# Patient Record
Sex: FEMALE | Race: WHITE | NOT HISPANIC OR LATINO | Employment: FULL TIME | URBAN - NONMETROPOLITAN AREA
[De-identification: names, ages, dates, MRNs, and addresses within clinical notes are randomized per-mention and may not be internally consistent; named-entity substitution may affect disease eponyms.]

---

## 2023-04-17 ENCOUNTER — HOSPITAL ENCOUNTER (OUTPATIENT)
Dept: DATA CONVERSION | Facility: HOSPITAL | Age: 38
End: 2023-04-17
Attending: NURSE PRACTITIONER

## 2024-01-30 ENCOUNTER — OFFICE VISIT (OUTPATIENT)
Dept: PRIMARY CARE | Facility: CLINIC | Age: 39
End: 2024-01-30
Payer: COMMERCIAL

## 2024-01-30 VITALS
WEIGHT: 215 LBS | BODY MASS INDEX: 35.82 KG/M2 | SYSTOLIC BLOOD PRESSURE: 116 MMHG | HEIGHT: 65 IN | DIASTOLIC BLOOD PRESSURE: 70 MMHG | HEART RATE: 72 BPM

## 2024-01-30 DIAGNOSIS — L40.9 PSORIASIS: ICD-10-CM

## 2024-01-30 DIAGNOSIS — I10 PRIMARY HYPERTENSION: ICD-10-CM

## 2024-01-30 DIAGNOSIS — F41.9 ANXIETY: ICD-10-CM

## 2024-01-30 DIAGNOSIS — F17.210 CIGARETTE NICOTINE DEPENDENCE WITHOUT COMPLICATION: ICD-10-CM

## 2024-01-30 DIAGNOSIS — E66.01 CLASS 2 SEVERE OBESITY DUE TO EXCESS CALORIES WITH SERIOUS COMORBIDITY AND BODY MASS INDEX (BMI) OF 35.0 TO 35.9 IN ADULT (MULTI): ICD-10-CM

## 2024-01-30 DIAGNOSIS — Z00.00 ROUTINE GENERAL MEDICAL EXAMINATION AT A HEALTH CARE FACILITY: Primary | ICD-10-CM

## 2024-01-30 PROCEDURE — 3078F DIAST BP <80 MM HG: CPT | Performed by: STUDENT IN AN ORGANIZED HEALTH CARE EDUCATION/TRAINING PROGRAM

## 2024-01-30 PROCEDURE — 99204 OFFICE O/P NEW MOD 45 MIN: CPT | Performed by: STUDENT IN AN ORGANIZED HEALTH CARE EDUCATION/TRAINING PROGRAM

## 2024-01-30 PROCEDURE — 3008F BODY MASS INDEX DOCD: CPT | Performed by: STUDENT IN AN ORGANIZED HEALTH CARE EDUCATION/TRAINING PROGRAM

## 2024-01-30 PROCEDURE — 3074F SYST BP LT 130 MM HG: CPT | Performed by: STUDENT IN AN ORGANIZED HEALTH CARE EDUCATION/TRAINING PROGRAM

## 2024-01-30 RX ORDER — BUSPIRONE HYDROCHLORIDE 5 MG/1
5 TABLET ORAL 2 TIMES DAILY PRN
Qty: 60 TABLET | Refills: 1 | Status: SHIPPED | OUTPATIENT
Start: 2024-01-30 | End: 2025-01-29

## 2024-01-30 RX ORDER — HYDROCHLOROTHIAZIDE 25 MG/1
25 TABLET ORAL DAILY
Qty: 90 TABLET | Refills: 3 | Status: SHIPPED | OUTPATIENT
Start: 2024-01-30 | End: 2025-01-29

## 2024-01-30 RX ORDER — SEMAGLUTIDE 0.25 MG/.5ML
0.25 INJECTION, SOLUTION SUBCUTANEOUS
Qty: 2 ML | Refills: 1 | Status: SHIPPED | OUTPATIENT
Start: 2024-01-30 | End: 2024-02-18

## 2024-01-30 RX ORDER — HYDROCHLOROTHIAZIDE 25 MG/1
25 TABLET ORAL DAILY
COMMUNITY
Start: 2023-07-06 | End: 2024-01-30 | Stop reason: SDUPTHER

## 2024-01-30 RX ORDER — LISINOPRIL 10 MG/1
10 TABLET ORAL DAILY
COMMUNITY
Start: 2024-01-04 | End: 2024-01-30 | Stop reason: SDUPTHER

## 2024-01-30 RX ORDER — LISINOPRIL 10 MG/1
10 TABLET ORAL DAILY
Qty: 90 TABLET | Refills: 3 | Status: SHIPPED | OUTPATIENT
Start: 2024-01-30 | End: 2025-01-29

## 2024-01-30 ASSESSMENT — PATIENT HEALTH QUESTIONNAIRE - PHQ9
SUM OF ALL RESPONSES TO PHQ9 QUESTIONS 1 AND 2: 0
2. FEELING DOWN, DEPRESSED OR HOPELESS: NOT AT ALL
1. LITTLE INTEREST OR PLEASURE IN DOING THINGS: NOT AT ALL

## 2024-01-30 NOTE — PROGRESS NOTES
Subjective   Patient ID: Mile Dumont is a 38 y.o. female who presents for establish care. Previous provider [facility].     HPI  HTN - managed on lisinopril and hydrochlorothiazide, states that she did try off  the antihypertensives and had significant symptoms, has been treated x7yrs, tolerates the medications, asymptmoatic when she takes them    Anxiety - managed on daily Xanax x19yrs with previous PCP, was on 1mg 3-4x/d then stopped abruptly, anxiety now seems to flare the week before periods which are regular, struggles with intrusive thoughts, tried hydroxyzine which did help, tried trazodone a long time ago, did try propranolol in the past as well, does have good support system at home    BMI - started exercising on treadmill daily 3wk ago, started at 1mi and has been increasing, has tried Adipex in the past as well as semaglutide at [facility] more recently helped in the past but not more regularly, there is no fhx thyroid ca and no personal hx pancreatitis    Psoriasis - chronic, localized mainly to lower extremities    Cervical Cancer Screening: [facility], due  Breast Cancer Screening: plan to start at age 40  Osteoporosis Screening: plan to start at age 65  Colon Cancer Screening: no fhx, asymptomatic, plan to start at age 44  Tobacco: 10 pack-yr hx, 1/2ppd since teenager, not yet ready to quit  Alcohol: social  Recreational Drugs: denies  Immunizations: declines influenza and [id number]     Review of Systems   Constitutional:  Negative for chills and fever.   HENT:  Negative for congestion and rhinorrhea.    Eyes:  Negative for pain and redness.   Respiratory:  Negative for cough and shortness of breath.    Cardiovascular:  Negative for chest pain, palpitations and leg swelling.   Gastrointestinal:  Negative for abdominal pain, blood in stool, constipation, diarrhea, nausea and vomiting.   Endocrine: Negative for cold intolerance and heat intolerance.   Genitourinary:  Negative for dysuria and  "flank pain.   Musculoskeletal:  Negative for arthralgias and myalgias.   Skin:  Negative for rash.   Neurological:  Negative for dizziness, weakness, numbness and headaches.   Hematological:  Negative for adenopathy.   Psychiatric/Behavioral:  Negative for dysphoric mood and sleep disturbance. The patient is nervous/anxious.      Objective   /70   Pulse 72   Ht 1.651 m (5' 5\")   Wt 97.5 kg (215 lb)   BMI 35.78 kg/m²     Physical Exam  Vitals reviewed.   Constitutional:       General: She is not in acute distress.     Appearance: Normal appearance.   HENT:      Head: Normocephalic and atraumatic.   Eyes:      General: No scleral icterus.     Conjunctiva/sclera: Conjunctivae normal.   Cardiovascular:      Rate and Rhythm: Normal rate and regular rhythm.      Heart sounds: No murmur heard.  Pulmonary:      Effort: Pulmonary effort is normal. No respiratory distress.      Breath sounds: Normal breath sounds.   Abdominal:      General: Bowel sounds are normal. There is no distension.      Palpations: Abdomen is soft.      Tenderness: There is no abdominal tenderness. There is no guarding or rebound.   Musculoskeletal:         General: No swelling or deformity.      Cervical back: Normal range of motion and neck supple.   Skin:     General: Skin is warm and dry.      Findings: No rash.   Neurological:      General: No focal deficit present.      Mental Status: She is alert.   Psychiatric:         Mood and Affect: Mood normal.         Behavior: Behavior normal.       Assessment/Plan   Problem List Items Addressed This Visit             ICD-10-CM    Psoriasis L40.9     Chronic. Will continue to monitor.         Primary hypertension I10     BP at goal. Continue lisinopril and hydrochlorothiazide at current doses. Will update labs and will follow up with results.         Relevant Medications    hydroCHLOROthiazide (HYDRODiuril) 25 mg tablet    lisinopril 10 mg tablet    Class 2 severe obesity due to excess calories " with serious comorbidity and body mass index (BMI) of 35.0 to 35.9 in adult (CMS/[facility]) E66.01, Z68.35     Dietary modifications and recommendation for 150 minutes of moderate-intensity exercise per week reviewed. Reviewed options for pharmacologic assistance. Using shared decision making, we decided to trial GLP1. No contraindications. Medication dosing and side effects reviewed. Return precautions reviewed.          Relevant Medications    semaglutide, weight loss, (Wegovy) 0.25 mg/0.5 mL pen injector    Cigarette nicotine dependence without complication F17.210     Encouraged smoking cessation. Patient instructed to follow up if/when she desires pharmacologic assistance.           Anxiety F41.9     Reviewed treatment options. Using shared decision making, we decided to trial Buspar. Medication dosing and side effects reviewed. Return precautions reviewed.          Relevant Medications    busPIRone (Buspar) 5 mg tablet     Other Visit Diagnoses         Codes    Routine general medical examination at a health care facility    -  Primary Z00.00    Relevant Orders    CBC and Auto Differential    Comprehensive Metabolic Panel    Lipid Panel        Will update labs and will follow up with results. Follow up in 6wk for recheck, sooner if needed.

## 2024-02-04 PROBLEM — I10 PRIMARY HYPERTENSION: Status: ACTIVE | Noted: 2024-02-04

## 2024-02-04 PROBLEM — F17.210 CIGARETTE NICOTINE DEPENDENCE WITHOUT COMPLICATION: Status: ACTIVE | Noted: 2024-02-04

## 2024-02-04 PROBLEM — E66.812 CLASS 2 SEVERE OBESITY DUE TO EXCESS CALORIES WITH SERIOUS COMORBIDITY AND BODY MASS INDEX (BMI) OF 35.0 TO 35.9 IN ADULT: Status: ACTIVE | Noted: 2024-02-04

## 2024-02-04 PROBLEM — L40.9 PSORIASIS: Status: ACTIVE | Noted: 2024-02-04

## 2024-02-04 PROBLEM — E66.01 CLASS 2 SEVERE OBESITY DUE TO EXCESS CALORIES WITH SERIOUS COMORBIDITY AND BODY MASS INDEX (BMI) OF 35.0 TO 35.9 IN ADULT (MULTI): Status: ACTIVE | Noted: 2024-02-04

## 2024-02-04 PROBLEM — F41.9 ANXIETY: Status: ACTIVE | Noted: 2024-02-04

## 2024-02-04 ASSESSMENT — ENCOUNTER SYMPTOMS
FEVER: 0
MYALGIAS: 0
EYE REDNESS: 0
NUMBNESS: 0
FLANK PAIN: 0
BLOOD IN STOOL: 0
DIARRHEA: 0
EYE PAIN: 0
RHINORRHEA: 0
COUGH: 0
ABDOMINAL PAIN: 0
WEAKNESS: 0
VOMITING: 0
ARTHRALGIAS: 0
DIZZINESS: 0
PALPITATIONS: 0
CHILLS: 0
ADENOPATHY: 0
NERVOUS/ANXIOUS: 1
NAUSEA: 0
DYSURIA: 0
SHORTNESS OF BREATH: 0
CONSTIPATION: 0
SLEEP DISTURBANCE: 0
DYSPHORIC MOOD: 0
HEADACHES: 0

## 2024-02-04 NOTE — ASSESSMENT & PLAN NOTE
Reviewed treatment options. Using shared decision making, we decided to trial Buspar. Medication dosing and side effects reviewed. Return precautions reviewed.

## 2024-02-04 NOTE — ASSESSMENT & PLAN NOTE
BP at goal. Continue lisinopril and hydrochlorothiazide at current doses. Will update labs and will follow up with results.

## 2024-02-04 NOTE — ASSESSMENT & PLAN NOTE
Encouraged smoking cessation. Patient instructed to follow up if/when she desires pharmacologic assistance.

## 2024-02-04 NOTE — ASSESSMENT & PLAN NOTE
Dietary modifications and recommendation for 150 minutes of moderate-intensity exercise per week reviewed. Reviewed options for pharmacologic assistance. Using shared decision making, we decided to trial GLP1. No contraindications. Medication dosing and side effects reviewed. Return precautions reviewed.

## 2024-02-16 ENCOUNTER — OFFICE VISIT (OUTPATIENT)
Dept: PRIMARY CARE | Facility: CLINIC | Age: 39
End: 2024-02-16
Payer: COMMERCIAL

## 2024-02-16 VITALS
OXYGEN SATURATION: 95 % | WEIGHT: 218 LBS | BODY MASS INDEX: 36.28 KG/M2 | DIASTOLIC BLOOD PRESSURE: 80 MMHG | SYSTOLIC BLOOD PRESSURE: 128 MMHG | HEART RATE: 108 BPM

## 2024-02-16 DIAGNOSIS — E66.01 CLASS 2 SEVERE OBESITY DUE TO EXCESS CALORIES WITH SERIOUS COMORBIDITY AND BODY MASS INDEX (BMI) OF 35.0 TO 35.9 IN ADULT (MULTI): Primary | ICD-10-CM

## 2024-02-16 DIAGNOSIS — E66.01 CLASS 2 SEVERE OBESITY DUE TO EXCESS CALORIES WITH SERIOUS COMORBIDITY AND BODY MASS INDEX (BMI) OF 36.0 TO 36.9 IN ADULT (MULTI): ICD-10-CM

## 2024-02-16 DIAGNOSIS — F41.9 ANXIETY: ICD-10-CM

## 2024-02-16 DIAGNOSIS — N91.2 AMENORRHEA: ICD-10-CM

## 2024-02-16 DIAGNOSIS — I10 PRIMARY HYPERTENSION: ICD-10-CM

## 2024-02-16 LAB — PREGNANCY TEST URINE, POC: NEGATIVE

## 2024-02-16 PROCEDURE — 3074F SYST BP LT 130 MM HG: CPT | Performed by: STUDENT IN AN ORGANIZED HEALTH CARE EDUCATION/TRAINING PROGRAM

## 2024-02-16 PROCEDURE — 80307 DRUG TEST PRSMV CHEM ANLYZR: CPT

## 2024-02-16 PROCEDURE — 99213 OFFICE O/P EST LOW 20 MIN: CPT | Performed by: STUDENT IN AN ORGANIZED HEALTH CARE EDUCATION/TRAINING PROGRAM

## 2024-02-16 PROCEDURE — 81025 URINE PREGNANCY TEST: CPT | Performed by: STUDENT IN AN ORGANIZED HEALTH CARE EDUCATION/TRAINING PROGRAM

## 2024-02-16 PROCEDURE — 3008F BODY MASS INDEX DOCD: CPT | Performed by: STUDENT IN AN ORGANIZED HEALTH CARE EDUCATION/TRAINING PROGRAM

## 2024-02-16 PROCEDURE — 3079F DIAST BP 80-89 MM HG: CPT | Performed by: STUDENT IN AN ORGANIZED HEALTH CARE EDUCATION/TRAINING PROGRAM

## 2024-02-16 RX ORDER — TOPIRAMATE 25 MG/1
25 TABLET ORAL 2 TIMES DAILY
Qty: 60 TABLET | Refills: 1 | Status: SHIPPED | OUTPATIENT
Start: 2024-02-16 | End: 2024-04-22 | Stop reason: ALTCHOICE

## 2024-02-16 NOTE — PROGRESS NOTES
Subjective   Patient ID: Mile Dumont is a 38 y.o. female who presents for Weight Loss (Discuss medication).    HPI  BMI - GLP1 not covered by insurance, had success with Adipex in the past, no side effects, she has been watching diet, planning to start exercise regimen, interested in retrying Adipex and wonders also about Topamax    Anxiety - Buspar is helping, she is taking as needed, works well when she takes it    Amenorrhea - about 10d late which is somewhat unusual, she is s/p tubal ligation    Review of Systems   Constitutional:  Negative for chills and fever.   Respiratory:  Negative for cough and shortness of breath.    Cardiovascular:  Negative for chest pain and palpitations.   Skin:  Negative for rash.   Psychiatric/Behavioral:  Negative for dysphoric mood. The patient is not nervous/anxious.      Objective   /80   Pulse 108   Wt 98.9 kg (218 lb)   SpO2 95%   BMI 36.28 kg/m²     Physical Exam  Constitutional:       Appearance: Normal appearance.   HENT:      Head: Normocephalic and atraumatic.   Eyes:      General: No scleral icterus.     Conjunctiva/sclera: Conjunctivae normal.   Cardiovascular:      Rate and Rhythm: Normal rate and regular rhythm.      Heart sounds: No murmur heard.  Pulmonary:      Effort: Pulmonary effort is normal. No respiratory distress.      Breath sounds: Normal breath sounds.   Musculoskeletal:         General: No swelling. Normal range of motion.      Cervical back: Normal range of motion and neck supple.   Skin:     General: Skin is warm and dry.      Findings: No rash.   Neurological:      General: No focal deficit present.      Mental Status: She is alert.   Psychiatric:         Mood and Affect: Mood normal.         Behavior: Behavior normal.       Assessment/Plan   Problem List Items Addressed This Visit             ICD-10-CM    Primary hypertension I10     BP at goal. No changes today. I am comfortable with Adipex.         Class 2 severe obesity due to excess  calories with serious comorbidity and body mass index (BMI) of 36.0 to 36.9 in adult (CMS/[facility]) - Primary E66.01, Z68.36     Reviewed options for pharmacologic assistance. She had success with Adipex in the past - I am okay with another round pending UDS which was obtained today. We will also trial Topamax. Medication dosing and side effects reviewed. Dietary modifications and recommendation for 150 minutes of moderate-intensity exercise per week reviewed.     I have personally reviewed the OARRS for this patient. I have considered the risk dependence, addiction, and diversion. There are no concerns at this time. I believe that it is clinically appropriate for this patient to be prescribed this medication based on documented diagnosis.          Relevant Medications    topiramate (Topamax) 25 mg tablet    Anxiety F41.9     Improved with prn Buspar. Will continue.         Amenorrhea N91.2     10d late. Urine hcg neg today in office. Will continue to monitor.         Relevant Orders    POCT pregnancy, urine manually resulted (Completed)   Follow up in 1mo for recheck, sooner if needed.

## 2024-02-17 LAB
AMPHETAMINES UR QL SCN: NORMAL
BARBITURATES UR QL SCN: NORMAL
BENZODIAZ UR QL SCN: NORMAL
BZE UR QL SCN: NORMAL
CANNABINOIDS UR QL SCN: NORMAL
FENTANYL+NORFENTANYL UR QL SCN: NORMAL
OPIATES UR QL SCN: NORMAL
OXYCODONE+OXYMORPHONE UR QL SCN: NORMAL
PCP UR QL SCN: NORMAL

## 2024-02-18 DIAGNOSIS — E66.01 CLASS 2 SEVERE OBESITY DUE TO EXCESS CALORIES WITH SERIOUS COMORBIDITY AND BODY MASS INDEX (BMI) OF 36.0 TO 36.9 IN ADULT (MULTI): Primary | ICD-10-CM

## 2024-02-18 PROBLEM — N91.2 AMENORRHEA: Status: ACTIVE | Noted: 2024-02-18

## 2024-02-18 RX ORDER — PHENTERMINE HYDROCHLORIDE 37.5 MG/1
37.5 TABLET ORAL
Qty: 30 TABLET | Refills: 0 | Status: SHIPPED | OUTPATIENT
Start: 2024-02-18 | End: 2024-03-20 | Stop reason: SDUPTHER

## 2024-02-18 ASSESSMENT — ENCOUNTER SYMPTOMS
FEVER: 0
COUGH: 0
DYSPHORIC MOOD: 0
SHORTNESS OF BREATH: 0
NERVOUS/ANXIOUS: 0
PALPITATIONS: 0
CHILLS: 0

## 2024-02-18 NOTE — ASSESSMENT & PLAN NOTE
Reviewed options for pharmacologic assistance. She had success with Adipex in the past - I am okay with another round pending UDS which was obtained today. We will also trial Topamax. Medication dosing and side effects reviewed. Dietary modifications and recommendation for 150 minutes of moderate-intensity exercise per week reviewed.     I have personally reviewed the OARRS for this patient. I have considered the risk dependence, addiction, and diversion. There are no concerns at this time. I believe that it is clinically appropriate for this patient to be prescribed this medication based on documented diagnosis.

## 2024-02-26 DIAGNOSIS — R05.1 ACUTE COUGH: Primary | ICD-10-CM

## 2024-02-26 RX ORDER — AZITHROMYCIN 250 MG/1
TABLET, FILM COATED ORAL
Qty: 6 TABLET | Refills: 0 | Status: SHIPPED | OUTPATIENT
Start: 2024-02-26 | End: 2024-03-01

## 2024-03-12 ENCOUNTER — OFFICE VISIT (OUTPATIENT)
Dept: PRIMARY CARE | Facility: CLINIC | Age: 39
End: 2024-03-12
Payer: COMMERCIAL

## 2024-03-12 VITALS
SYSTOLIC BLOOD PRESSURE: 118 MMHG | DIASTOLIC BLOOD PRESSURE: 80 MMHG | HEIGHT: 65 IN | BODY MASS INDEX: 35.75 KG/M2 | WEIGHT: 214.6 LBS | HEART RATE: 92 BPM

## 2024-03-12 DIAGNOSIS — E66.01 CLASS 2 SEVERE OBESITY DUE TO EXCESS CALORIES WITH SERIOUS COMORBIDITY AND BODY MASS INDEX (BMI) OF 35.0 TO 35.9 IN ADULT (MULTI): ICD-10-CM

## 2024-03-12 DIAGNOSIS — I10 PRIMARY HYPERTENSION: ICD-10-CM

## 2024-03-12 DIAGNOSIS — H60.501 ACUTE OTITIS EXTERNA OF RIGHT EAR, UNSPECIFIED TYPE: Primary | ICD-10-CM

## 2024-03-12 PROBLEM — N91.2 AMENORRHEA: Status: RESOLVED | Noted: 2024-02-18 | Resolved: 2024-03-12

## 2024-03-12 PROCEDURE — 99213 OFFICE O/P EST LOW 20 MIN: CPT | Performed by: STUDENT IN AN ORGANIZED HEALTH CARE EDUCATION/TRAINING PROGRAM

## 2024-03-12 PROCEDURE — 3079F DIAST BP 80-89 MM HG: CPT | Performed by: STUDENT IN AN ORGANIZED HEALTH CARE EDUCATION/TRAINING PROGRAM

## 2024-03-12 PROCEDURE — 3074F SYST BP LT 130 MM HG: CPT | Performed by: STUDENT IN AN ORGANIZED HEALTH CARE EDUCATION/TRAINING PROGRAM

## 2024-03-12 PROCEDURE — 3008F BODY MASS INDEX DOCD: CPT | Performed by: STUDENT IN AN ORGANIZED HEALTH CARE EDUCATION/TRAINING PROGRAM

## 2024-03-12 RX ORDER — CIPROFLOXACIN AND DEXAMETHASONE 3; 1 MG/ML; MG/ML
4 SUSPENSION/ DROPS AURICULAR (OTIC) 2 TIMES DAILY
Qty: 7.5 ML | Refills: 0 | Status: SHIPPED | OUTPATIENT
Start: 2024-03-12 | End: 2024-03-19

## 2024-03-12 ASSESSMENT — ENCOUNTER SYMPTOMS
SHORTNESS OF BREATH: 0
DIARRHEA: 1
CHILLS: 0
DYSPHORIC MOOD: 0
PALPITATIONS: 0
NERVOUS/ANXIOUS: 0
FEVER: 0
COUGH: 0

## 2024-03-12 NOTE — PROGRESS NOTES
"Subjective   Patient ID: Mile Dumont is a 38 y.o. female who presents for 6 week follow up    HPI  BMI - down 4lbs, doing well with Adipex and Topamax, increased water intake, no adverse effects aside from occasional diarrhea    HTN - BP at goal, tolerating antihypertensives, no changes in BP with stimulant    Ear itching - notes chronic intermittent ear itching and fullness, having itching and fullness on the R currently    Review of Systems   Constitutional:  Negative for chills and fever.   HENT:          R ear itching and fullness   Respiratory:  Negative for cough and shortness of breath.    Cardiovascular:  Negative for chest pain and palpitations.   Gastrointestinal:  Positive for diarrhea.   Skin:  Negative for rash.   Psychiatric/Behavioral:  Negative for dysphoric mood. The patient is not nervous/anxious.      Objective   /80   Pulse 92   Ht 1.651 m (5' 5\")   Wt 97.3 kg (214 lb 9.6 oz)   BMI 35.71 kg/m²     Physical Exam  Constitutional:       Appearance: Normal appearance.   HENT:      Head: Normocephalic and atraumatic.      Left Ear: Tympanic membrane, ear canal and external ear normal.      Ears:      Comments: L EAC edematous with purulent material in canal, TM not visualized  Eyes:      General: No scleral icterus.     Conjunctiva/sclera: Conjunctivae normal.   Cardiovascular:      Rate and Rhythm: Normal rate and regular rhythm.      Heart sounds: No murmur heard.  Pulmonary:      Effort: Pulmonary effort is normal. No respiratory distress.   Musculoskeletal:         General: No swelling. Normal range of motion.      Cervical back: Normal range of motion and neck supple.   Skin:     General: Skin is warm and dry.      Findings: No rash.   Neurological:      General: No focal deficit present.      Mental Status: She is alert.   Psychiatric:         Mood and Affect: Mood normal.         Behavior: Behavior normal.       Assessment/Plan   Problem List Items Addressed This Visit             " ICD-10-CM    Primary hypertension I10     BP at goal. No changes today.         Class 2 severe obesity due to excess calories with serious comorbidity and body mass index (BMI) of 35.0 to 35.9 in adult (CMS/[facility]) E66.01, Z68.35     Doing well with Adipex and Topamax. Discussed dosing. Using shared decision making, we decided to increase Topamax. She will try increasing Topamax to 50mg qam and 25mg at bedtime and then up to 50mg bid as tolerated. Return precautions reviewed.     I have personally reviewed the OARRS for this patient. I have considered the risk dependence, addiction, and diversion. There are no concerns at this time. I believe that it is clinically appropriate for this patient to be prescribed this medication based on documented diagnosis.          Relevant Orders    Follow Up In Primary Care - Established    Follow Up In Primary Care - Established    Acute otitis externa of right ear - Primary H60.501     Will tx with Ciprodex. Medication dosing and side effects reviewed. Return precautions reviewed.          Relevant Medications    ciprofloxacin-dexamethasone (CiproDEX) otic suspension    Other Relevant Orders    Follow Up In Primary Care - Established   Follow up in 1mo for recheck, sooner if needed.

## 2024-03-12 NOTE — ASSESSMENT & PLAN NOTE
Doing well with Adipex and Topamax. Discussed dosing. Using shared decision making, we decided to increase Topamax. She will try increasing Topamax to 50mg qam and 25mg at bedtime and then up to 50mg bid as tolerated. Return precautions reviewed.     I have personally reviewed the OARRS for this patient. I have considered the risk dependence, addiction, and diversion. There are no concerns at this time. I believe that it is clinically appropriate for this patient to be prescribed this medication based on documented diagnosis.

## 2024-03-20 DIAGNOSIS — E66.01 CLASS 2 SEVERE OBESITY DUE TO EXCESS CALORIES WITH SERIOUS COMORBIDITY AND BODY MASS INDEX (BMI) OF 36.0 TO 36.9 IN ADULT (MULTI): ICD-10-CM

## 2024-03-20 RX ORDER — PHENTERMINE HYDROCHLORIDE 37.5 MG/1
37.5 TABLET ORAL
Qty: 30 TABLET | Refills: 0 | Status: SHIPPED | OUTPATIENT
Start: 2024-03-20 | End: 2024-04-22 | Stop reason: ALTCHOICE

## 2024-04-22 ENCOUNTER — OFFICE VISIT (OUTPATIENT)
Dept: PRIMARY CARE | Facility: CLINIC | Age: 39
End: 2024-04-22
Payer: COMMERCIAL

## 2024-04-22 VITALS
WEIGHT: 216.6 LBS | SYSTOLIC BLOOD PRESSURE: 108 MMHG | DIASTOLIC BLOOD PRESSURE: 76 MMHG | HEIGHT: 65 IN | HEART RATE: 123 BPM | BODY MASS INDEX: 36.09 KG/M2 | OXYGEN SATURATION: 95 %

## 2024-04-22 DIAGNOSIS — E66.01 CLASS 2 SEVERE OBESITY DUE TO EXCESS CALORIES WITH SERIOUS COMORBIDITY AND BODY MASS INDEX (BMI) OF 35.0 TO 35.9 IN ADULT (MULTI): ICD-10-CM

## 2024-04-22 PROBLEM — H60.501 ACUTE OTITIS EXTERNA OF RIGHT EAR: Status: RESOLVED | Noted: 2024-03-12 | Resolved: 2024-04-22

## 2024-04-22 PROCEDURE — 3074F SYST BP LT 130 MM HG: CPT | Performed by: STUDENT IN AN ORGANIZED HEALTH CARE EDUCATION/TRAINING PROGRAM

## 2024-04-22 PROCEDURE — 3078F DIAST BP <80 MM HG: CPT | Performed by: STUDENT IN AN ORGANIZED HEALTH CARE EDUCATION/TRAINING PROGRAM

## 2024-04-22 PROCEDURE — 3008F BODY MASS INDEX DOCD: CPT | Performed by: STUDENT IN AN ORGANIZED HEALTH CARE EDUCATION/TRAINING PROGRAM

## 2024-04-22 PROCEDURE — 99212 OFFICE O/P EST SF 10 MIN: CPT | Performed by: STUDENT IN AN ORGANIZED HEALTH CARE EDUCATION/TRAINING PROGRAM

## 2024-04-22 ASSESSMENT — ENCOUNTER SYMPTOMS
SHORTNESS OF BREATH: 0
COUGH: 0
CHILLS: 0
FEVER: 0
PALPITATIONS: 0
NERVOUS/ANXIOUS: 0
DYSPHORIC MOOD: 0

## 2024-04-22 NOTE — PROGRESS NOTES
"Subjective   Patient ID: Mile Dumont is a 38 y.o. female who presents for 1 month follow up to medication.     HPI  BMI - stopped Topamax due to facial numbness, resolved when she stopped taking it, also noticed the the Adipex wasn't seeming to help without the Topamax so stopped it as well, she is interested in GLP1 trial, no personal hx pancreatitis, no fhx thyroid ca, she is active at baseline and tries to watch her diet    Review of Systems   Constitutional:  Negative for chills and fever.   Respiratory:  Negative for cough and shortness of breath.    Cardiovascular:  Negative for chest pain and palpitations.   Skin:  Negative for rash.   Psychiatric/Behavioral:  Negative for dysphoric mood. The patient is not nervous/anxious.      Objective   /76 (BP Location: Left arm, Patient Position: Sitting, BP Cuff Size: Child)   Pulse (!) 123   Ht 1.651 m (5' 5\")   Wt 98.2 kg (216 lb 9.6 oz)   SpO2 95%   BMI 36.04 kg/m²     Physical Exam  Constitutional:       Appearance: Normal appearance.   HENT:      Head: Normocephalic.   Eyes:      General: No scleral icterus.     Conjunctiva/sclera: Conjunctivae normal.   Pulmonary:      Effort: Pulmonary effort is normal. No respiratory distress.   Musculoskeletal:         General: Normal range of motion.   Skin:     Findings: No rash.   Neurological:      Mental Status: She is alert.   Psychiatric:         Mood and Affect: Mood normal.         Behavior: Behavior normal.       Assessment/Plan   Problem List Items Addressed This Visit             ICD-10-CM    Class 2 severe obesity due to excess calories with serious comorbidity and body mass index (BMI) of 35.0 to 35.9 in adult ([facility]) E66.01, Z68.35     Unable to tolerate Topamax and did not have much success with Adipex. I agree that she would be a great candidate for GLP1 pending insurance approval. She has done some research through her insurance and found documentation that Wegovy is approved for weight loss " assuming BMI in their range. She will provide copy of documentation for my review.    Addendum 4/26/2024: Patient is excellent candidate for GLP1 therapy. She has unfortunately not been success with lifestyle modification for >6mo despite maintaining regular exercise regimen of at least 150min per week and 30% calorie deficit. She has tried alternative pharmacologic options for weight loss, has not been successful, and is not taking currently. She has no contraindications to GLP1 therapy.         Relevant Medications    semaglutide, weight loss, (Wegovy) 0.25 mg/0.5 mL pen injector (Start on 4/28/2024)

## 2024-04-23 NOTE — ASSESSMENT & PLAN NOTE
Unable to tolerate Topamax and did not have much success with Adipex. I agree that she would be a great candidate for GLP1 pending insurance approval. She has done some research through her insurance and found documentation that Wegovy is approved for weight loss assuming BMI in their range. She will provide copy of documentation for my review.    Addendum 4/26/2024: Patient is excellent candidate for GLP1 therapy. She has unfortunately not been success with lifestyle modification for >6mo despite maintaining regular exercise regimen of at least 150min per week and 30% calorie deficit. She has tried alternative pharmacologic options for weight loss, has not been successful, and is not taking currently. She has no contraindications to GLP1 therapy.

## 2024-04-26 RX ORDER — SEMAGLUTIDE 0.25 MG/.5ML
0.25 INJECTION, SOLUTION SUBCUTANEOUS
Qty: 2 ML | Refills: 1 | Status: SHIPPED | OUTPATIENT
Start: 2024-04-28 | End: 2024-05-20

## 2024-05-22 ENCOUNTER — APPOINTMENT (OUTPATIENT)
Dept: URGENT CARE | Facility: CLINIC | Age: 39
End: 2024-05-22
Payer: COMMERCIAL

## 2024-05-22 ENCOUNTER — OFFICE VISIT (OUTPATIENT)
Dept: URGENT CARE | Facility: CLINIC | Age: 39
End: 2024-05-22
Payer: COMMERCIAL

## 2024-05-22 VITALS
HEART RATE: 84 BPM | TEMPERATURE: 98.3 F | DIASTOLIC BLOOD PRESSURE: 88 MMHG | SYSTOLIC BLOOD PRESSURE: 138 MMHG | RESPIRATION RATE: 16 BRPM | BODY MASS INDEX: 35.82 KG/M2 | HEIGHT: 65 IN | WEIGHT: 215 LBS | OXYGEN SATURATION: 98 %

## 2024-05-22 DIAGNOSIS — H69.93 EUSTACHIAN TUBE DISORDER, BILATERAL: Primary | ICD-10-CM

## 2024-05-22 DIAGNOSIS — J01.90 ACUTE RHINOSINUSITIS: ICD-10-CM

## 2024-05-22 DIAGNOSIS — H61.21 IMPACTED CERUMEN OF RIGHT EAR: ICD-10-CM

## 2024-05-22 LAB — POC RAPID STREP: NEGATIVE

## 2024-05-22 PROCEDURE — 69210 REMOVE IMPACTED EAR WAX UNI: CPT | Performed by: PHYSICIAN ASSISTANT

## 2024-05-22 PROCEDURE — 87880 STREP A ASSAY W/OPTIC: CPT | Mod: 59 | Performed by: PHYSICIAN ASSISTANT

## 2024-05-22 PROCEDURE — 99212 OFFICE O/P EST SF 10 MIN: CPT | Performed by: PHYSICIAN ASSISTANT

## 2024-05-22 RX ORDER — PREDNISONE 10 MG/1
30 TABLET ORAL DAILY
Qty: 21 TABLET | Refills: 0 | Status: SHIPPED | OUTPATIENT
Start: 2024-05-22 | End: 2024-05-29

## 2024-05-22 ASSESSMENT — VISUAL ACUITY: OU: 1

## 2024-05-22 NOTE — PROGRESS NOTES
[facility]   ADITHYA NOTE:      Name: Mile Dumont, 38 y.o.    CSN:[id number]   MRN:[id number]    PCP: Fatimah Castillo,     ALL:    Allergies   Allergen Reactions    Kiwi Hivnolan       History:    Chief Complaint: Sore Throat (Sore throat since Sunday. Pt has been taking Amoxicillin since Monday q12hr.)    Encounter Date: 5/22/2024      HPI: The history was obtained from the patient. Mile is a 38 y.o. female, who presents with a chief complaint of Sore Throat (Sore throat since Sunday. Pt has been taking Amoxicillin since Monday q12hr.) she's having some facial pain/pressure, ear fullness (ongoing for 5 years with a hearing disturbance), using some OTC meds - antihistamine and decongestant with modest relief. Pt has been using an antibiotic from a friend, she's having modest relief initially, nothing sustained.     She denies any RABAGO, chest pain nor dizziness.    PMHx:    Past Medical History:   Diagnosis Date    Hypertension               Current Outpatient Medications   Medication Sig Dispense Refill    busPIRone (Buspar) 5 mg tablet Take 1 tablet (5 mg) by mouth 2 times a day as needed (anxiety). 60 tablet 1    hydroCHLOROthiazide (HYDRODiuril) 25 mg tablet Take 1 tablet (25 mg) by mouth once daily. 90 tablet 3    lisinopril 10 mg tablet Take 1 tablet (10 mg) by mouth once daily. 90 tablet 3    predniSONE (Deltasone) 10 mg tablet Take 3 tablets (30 mg) by mouth once daily for 7 days. 21 tablet 0    semaglutide, weight loss, (Wegovy) 0.25 mg/0.5 mL pen injector Inject 0.25 mg under the skin 1 (one) time per week for 4 doses. 2 mL 1     No current facility-administered medications for this visit.         PMSx:    Past Surgical History:   Procedure Laterality Date    ABCESS DRAINAGE      TUBAL LIGATION         Fam Hx:   Family History   Problem Relation Name Age of Onset    Hypertension Father      Diabetes Father      Colon cancer Father      Hypertension Sister      Diabetes  Sister         SOC. Hx:     Social History     Socioeconomic History    Marital status:      Spouse name: Not on file    Number of children: Not on file    Years of education: Not on file    Highest education level: Not on file   Occupational History    Not on file   Tobacco Use    Smoking status: Every Day     Current packs/day: 0.50     Types: Cigarettes    Smokeless tobacco: Never   Vaping Use    Vaping status: Some Days   Substance and Sexual Activity    Alcohol use: Yes    Drug use: Never    Sexual activity: Not on file   Other Topics Concern    Not on file   Social History Narrative    Not on file     Social Determinants of Health     Financial Resource Strain: Medium Risk (7/22/2022)    Received from [facility]    Overall Financial Resource Strain (CARDIA)     Difficulty of Paying Living Expenses: Somewhat hard   Food Insecurity: Food Insecurity Present (7/22/2022)    Received from [facility]    Hunger Vital Sign     Worried About Running Out of Food in the Last Year: Sometimes true     Ran Out of Food in the Last Year: Never true   Transportation Needs: No Transportation Needs (7/22/2022)    Received from [facility]    PRAPARE - Transportation     Lack of Transportation (Medical): No     Lack of Transportation (Non-Medical): No   Physical Activity: Inactive (7/22/2022)    Received from [facility]    Exercise Vital Sign     Days of Exercise per Week: 0 days     Minutes of Exercise per Session: 0 min   Stress: Stress Concern Present (7/22/2022)    Received from [facility]    [employer] [place] [employer] - Occupational Stress Questionnaire     Feeling of Stress : To some extent   Social Connections: Socially Isolated (7/22/2022)    Received from [facility]    Social Connection and Isolation Panel [NHANES]     Frequency of Communication with Friends and Family: Three times a week     Frequency of Social Gatherings with Friends and Family: Once a week     Attends Catholic Services: Never      Active Member of Clubs or Organizations: No     Attends Club or Organization Meetings: Never     Marital Status: Never    Intimate Partner Violence: Not At Risk (7/22/2022)    Received from [facility]    Humiliation, Afraid, Rape, and Kick questionnaire     Fear of Current or Ex-Partner: No     Emotionally Abused: No     Physically Abused: No     Sexually Abused: No   Housing Stability: High Risk (7/22/2022)    Received from [facility]    Housing Stability Vital Sign     Unable to Pay for Housing in the Last Year: Yes     Number of Places Lived in the Last Year: 1     Unstable Housing in the Last Year: No         Vitals:    05/22/24 0959   BP: 138/88   Pulse: 84   Resp: 16   Temp: 36.8 °C (98.3 °F)   SpO2: 98%     97.5 kg (215 lb)          Physical Exam  Vitals reviewed.   Constitutional:       Appearance: Normal appearance. She is normal weight.   HENT:      Head: Normocephalic and atraumatic.      Right Ear: Swelling present. There is impacted cerumen.      Ears:      Comments: Left ear TM noted sclerotic changes from previous ear tubes.     Nose: Mucosal edema and congestion present.      Mouth/Throat:      Lips: Pink.      Mouth: Mucous membranes are moist. No oral lesions.      Pharynx: Uvula midline.   Eyes:      General: Lids are normal. Vision grossly intact.      Extraocular Movements: Extraocular movements intact.   Cardiovascular:      Rate and Rhythm: Normal rate.   Pulmonary:      Effort: Pulmonary effort is normal.   Abdominal:      General: Abdomen is flat.   Musculoskeletal:         General: Normal range of motion.      Cervical back: Full passive range of motion without pain, normal range of motion and neck supple.   Skin:     General: Skin is warm.      Findings: No rash.   Neurological:      Mental Status: She is alert and oriented to person, place, and time.   Psychiatric:         Behavior: Behavior normal.         Patient ID: Mile Dumont is a 38 y.o. female.    Procedures  Procedure:  cerumen removal     Patient had a significant amount of cerumen in her right ear canal(s). Using flushing, VENANCIO Toribio and Remi COPE MA carefully removed as much cerumen as I could.  There was no TM perforation, and no bleeding, and she tolerated the procedure without difficulty.    ____________________________________________________________________    I did personally review Mile's past medical history, surgical history, social history, as well as family history (when relevant).  In this case, I also oversaw the her drug management by reviewing her medication list, allergy list, as well as the medications that I prescribed during the UC course and/or recommended as an out-patient (including possible OTC medications such as acetaminophen, NSAIDs , etc).    After reviewing the items above, I did look at previous medical documentation, such as recent hospitalizations, office visits, and/or recent consultations with PCP/specialist.                          SDOH:   Another factor that I considered in Mile's care was her Social Determinants of Health (SDOH). During this UC encounter, she did not have social determinants of health. Those SDOH influencing Mile's care are: none      _____________________________________________________________________      UC COURSE/MEDICAL DECISION MAKING:    Mile is a 38 y.o., who presents with a working diagnosis of   1. Eustachian tube disorder, bilateral    2. Impacted cerumen of right ear     with a differential to include: Influenza, parainfluenza, rhinovirus, adenovirus, metapneumovirus, coronavirus, COVID-19, postnasal drip, strep pharyngitis, GERD, retropharyngeal abscess, tonsillitis, adenitis, seasonal allergies      Tx with prednisone daily as a burst, discussed use of pseudoephedrine as well, discussed possible SE of meds & recommend reduced use of antihistamine.   Irrigation performed, she tolerated well, recommend discussion with GP regarding possible  referral to ENT for hearing disturbance.        Benigno Abbasi PA-C   Advanced Practice Provider  [facility]

## 2024-05-29 DIAGNOSIS — J01.90 ACUTE RHINOSINUSITIS: ICD-10-CM

## 2024-05-29 DIAGNOSIS — H69.91 ACUTE DYSFUNCTION OF RIGHT EUSTACHIAN TUBE: Primary | ICD-10-CM

## 2024-05-29 RX ORDER — CEFDINIR 300 MG/1
300 CAPSULE ORAL 2 TIMES DAILY
Qty: 20 CAPSULE | Refills: 0 | Status: SHIPPED | OUTPATIENT
Start: 2024-05-29 | End: 2024-06-08

## 2024-05-29 RX ORDER — DEXTROMETHORPHAN HYDROBROMIDE, GUAIFENESIN AND PSEUDOEPHEDRINE HYDROCHLORIDE 15; 400; 60 MG/1; MG/1; MG/1
1 TABLET ORAL 3 TIMES DAILY
Qty: 21 TABLET | Refills: 0 | Status: SHIPPED | OUTPATIENT
Start: 2024-05-29 | End: 2024-06-05

## 2024-06-05 DIAGNOSIS — F17.210 CIGARETTE NICOTINE DEPENDENCE WITHOUT COMPLICATION: ICD-10-CM

## 2024-06-05 DIAGNOSIS — E66.01 CLASS 2 SEVERE OBESITY DUE TO EXCESS CALORIES WITH SERIOUS COMORBIDITY AND BODY MASS INDEX (BMI) OF 35.0 TO 35.9 IN ADULT (MULTI): Primary | ICD-10-CM

## 2024-06-05 RX ORDER — BUPROPION HYDROCHLORIDE 150 MG/1
150 TABLET ORAL EVERY MORNING
Qty: 30 TABLET | Refills: 5 | Status: SHIPPED | OUTPATIENT
Start: 2024-06-05 | End: 2024-12-02

## 2024-06-25 DIAGNOSIS — E66.01 CLASS 2 SEVERE OBESITY DUE TO EXCESS CALORIES WITH SERIOUS COMORBIDITY AND BODY MASS INDEX (BMI) OF 35.0 TO 35.9 IN ADULT (MULTI): ICD-10-CM

## 2024-06-25 DIAGNOSIS — F17.210 CIGARETTE NICOTINE DEPENDENCE WITHOUT COMPLICATION: ICD-10-CM

## 2024-06-25 RX ORDER — BUPROPION HYDROCHLORIDE 300 MG/1
300 TABLET ORAL EVERY MORNING
Qty: 90 TABLET | Refills: 3 | Status: SHIPPED | OUTPATIENT
Start: 2024-06-25 | End: 2025-06-25

## 2024-10-03 DIAGNOSIS — I10 PRIMARY HYPERTENSION: ICD-10-CM

## 2024-10-03 DIAGNOSIS — F17.210 CIGARETTE NICOTINE DEPENDENCE WITHOUT COMPLICATION: ICD-10-CM

## 2024-10-03 DIAGNOSIS — E66.812 CLASS 2 SEVERE OBESITY DUE TO EXCESS CALORIES WITH SERIOUS COMORBIDITY AND BODY MASS INDEX (BMI) OF 35.0 TO 35.9 IN ADULT: ICD-10-CM

## 2024-10-03 DIAGNOSIS — E66.01 CLASS 2 SEVERE OBESITY DUE TO EXCESS CALORIES WITH SERIOUS COMORBIDITY AND BODY MASS INDEX (BMI) OF 35.0 TO 35.9 IN ADULT: ICD-10-CM

## 2024-10-03 DIAGNOSIS — F41.9 ANXIETY: ICD-10-CM

## 2024-10-03 RX ORDER — BUPROPION HYDROCHLORIDE 300 MG/1
300 TABLET ORAL EVERY MORNING
Qty: 90 TABLET | Refills: 1 | Status: SHIPPED | OUTPATIENT
Start: 2024-10-03 | End: 2025-10-03

## 2024-10-03 RX ORDER — BUSPIRONE HYDROCHLORIDE 5 MG/1
5 TABLET ORAL 2 TIMES DAILY PRN
Qty: 180 TABLET | Refills: 1 | Status: SHIPPED | OUTPATIENT
Start: 2024-10-03 | End: 2025-10-03

## 2024-10-03 RX ORDER — LISINOPRIL 10 MG/1
10 TABLET ORAL DAILY
Qty: 90 TABLET | Refills: 1 | Status: SHIPPED | OUTPATIENT
Start: 2024-10-03 | End: 2025-10-03

## 2024-10-03 RX ORDER — HYDROCHLOROTHIAZIDE 25 MG/1
25 TABLET ORAL DAILY
Qty: 90 TABLET | Refills: 1 | Status: SHIPPED | OUTPATIENT
Start: 2024-10-03 | End: 2025-10-03

## 2024-12-09 ENCOUNTER — APPOINTMENT (OUTPATIENT)
Dept: PRIMARY CARE | Facility: CLINIC | Age: 39
End: 2024-12-09
Payer: COMMERCIAL

## 2024-12-09 VITALS
HEIGHT: 65 IN | BODY MASS INDEX: 36.22 KG/M2 | SYSTOLIC BLOOD PRESSURE: 124 MMHG | WEIGHT: 217.38 LBS | DIASTOLIC BLOOD PRESSURE: 80 MMHG | HEART RATE: 98 BPM

## 2024-12-09 DIAGNOSIS — F41.9 ANXIETY: Primary | ICD-10-CM

## 2024-12-09 DIAGNOSIS — I10 PRIMARY HYPERTENSION: ICD-10-CM

## 2024-12-09 DIAGNOSIS — Z12.31 ENCOUNTER FOR SCREENING MAMMOGRAM FOR BREAST CANCER: ICD-10-CM

## 2024-12-09 PROCEDURE — 3074F SYST BP LT 130 MM HG: CPT | Performed by: STUDENT IN AN ORGANIZED HEALTH CARE EDUCATION/TRAINING PROGRAM

## 2024-12-09 PROCEDURE — 3008F BODY MASS INDEX DOCD: CPT | Performed by: STUDENT IN AN ORGANIZED HEALTH CARE EDUCATION/TRAINING PROGRAM

## 2024-12-09 PROCEDURE — 99214 OFFICE O/P EST MOD 30 MIN: CPT | Performed by: STUDENT IN AN ORGANIZED HEALTH CARE EDUCATION/TRAINING PROGRAM

## 2024-12-09 PROCEDURE — 3079F DIAST BP 80-89 MM HG: CPT | Performed by: STUDENT IN AN ORGANIZED HEALTH CARE EDUCATION/TRAINING PROGRAM

## 2024-12-09 RX ORDER — ESCITALOPRAM OXALATE 10 MG/1
10 TABLET ORAL DAILY
Qty: 30 TABLET | Refills: 1 | Status: SHIPPED | OUTPATIENT
Start: 2024-12-09 | End: 2025-06-07

## 2024-12-09 ASSESSMENT — ENCOUNTER SYMPTOMS
SHORTNESS OF BREATH: 0
PALPITATIONS: 0
DYSPHORIC MOOD: 0
COUGH: 0
CHILLS: 0
NERVOUS/ANXIOUS: 0
FEVER: 0

## 2024-12-09 NOTE — ASSESSMENT & PLAN NOTE
We reviewed that intrusive thoughts can be a symptom of anxiety. We discussed tx options. Using shared decision making, we decided to add Lexapro. Medication dosing and side effects reviewed. Will keep Wellbutrin and Buspar. We reviewed Buspar dosing. She may take additional tablet as needed. Return precautions reviewed.

## 2024-12-09 NOTE — PROGRESS NOTES
"Subjective   Patient ID: Mile Dumont is a 39 y.o. female who presents for Follow-up and Med Refill.    HPI  Anxiety - overall feeling well aside from intrusive thoughts, has struggled with same intrusive thoughts for quite some time, struggles with thinking about her  cheating despite regular reassurance from him and knowledge that he is not cheating, these thoughts affect her most days, she states she otherwise is really feeling great and happy, attributes this to the Wellbutrin, she is currently managed on Wellbutrin 300mg every day and Buspar 5mg every day, she has never been on a SSRI    HTN - BP is at goal, she is overall feeling well at this time    Cervical Cancer Screening: Women's Care, due  Breast Cancer Screening: due for initial  Osteoporosis Screening: plan to start at age 64  Colon Cancer Screening: plan to start at age 44  Tobacco: 1/2ppd since teenager, not yet ready to quit  Alcohol: social  Recreational Drugs: denies  Immunizations: declines influenza and [id number]    Review of Systems   Constitutional:  Negative for chills and fever.   Respiratory:  Negative for cough and shortness of breath.    Cardiovascular:  Negative for chest pain and palpitations.   Skin:  Negative for rash.   Psychiatric/Behavioral:  Negative for dysphoric mood. The patient is not nervous/anxious.      Objective   /80   Pulse 98   Ht 1.651 m (5' 5\")   Wt 98.6 kg (217 lb 6 oz)   BMI 36.17 kg/m²     Physical Exam  Constitutional:       Appearance: Normal appearance.   HENT:      Head: Normocephalic and atraumatic.   Eyes:      General: No scleral icterus.     Conjunctiva/sclera: Conjunctivae normal.   Cardiovascular:      Rate and Rhythm: Normal rate and regular rhythm.      Heart sounds: No murmur heard.  Pulmonary:      Effort: Pulmonary effort is normal. No respiratory distress.      Breath sounds: Normal breath sounds.   Musculoskeletal:         General: No swelling. Normal range of motion.      " Cervical back: Normal range of motion and neck supple.   Skin:     General: Skin is warm and dry.      Findings: No rash.   Neurological:      General: No focal deficit present.      Mental Status: She is alert.   Psychiatric:         Mood and Affect: Mood normal.         Behavior: Behavior normal.       Assessment/Plan   Problem List Items Addressed This Visit             ICD-10-CM    Primary hypertension I10     BP at goal. Continue lisinopril and hydrochlorothiazide at current doses. She plans to do labs in the near future. Will follow up with results when available.         Anxiety - Primary F41.9     We reviewed that intrusive thoughts can be a symptom of anxiety. We discussed tx options. Using shared decision making, we decided to add Lexapro. Medication dosing and side effects reviewed. Will keep Wellbutrin and Buspar. We reviewed Buspar dosing. She may take additional tablet as needed. Return precautions reviewed.          Relevant Medications    escitalopram (Lexapro) 10 mg tablet    Other Relevant Orders    Follow Up In Primary Care - Established     Other Visit Diagnoses         Codes    Encounter for screening mammogram for breast cancer     Z12.31    Relevant Orders    BI mammo bilateral screening tomosynthesis        Follow up in 1mo for recheck, sooner if needed.   Time Spent  Prep time on day of patient encounter: 1 minutes  Time spent directly with patient, family or caregiver: 33 minutes  Additional Time Spent on Patient Care Activities: 0 minutes  Documentation Time: 5 minutes  Other Time Spent: 0 minutes  Total: 39 minutes

## 2024-12-09 NOTE — ASSESSMENT & PLAN NOTE
BP at goal. Continue lisinopril and hydrochlorothiazide at current doses. She plans to do labs in the near future. Will follow up with results when available.

## 2024-12-24 ENCOUNTER — HOSPITAL ENCOUNTER (OUTPATIENT)
Dept: RADIOLOGY | Facility: CLINIC | Age: 39
Discharge: HOME | End: 2024-12-24
Payer: COMMERCIAL

## 2024-12-24 ENCOUNTER — LAB (OUTPATIENT)
Dept: LAB | Facility: LAB | Age: 39
End: 2024-12-24
Payer: COMMERCIAL

## 2024-12-24 VITALS — HEIGHT: 65 IN | WEIGHT: 211 LBS | BODY MASS INDEX: 35.16 KG/M2

## 2024-12-24 DIAGNOSIS — Z12.31 ENCOUNTER FOR SCREENING MAMMOGRAM FOR BREAST CANCER: ICD-10-CM

## 2024-12-24 DIAGNOSIS — R73.01 ELEVATED FASTING GLUCOSE: ICD-10-CM

## 2024-12-24 DIAGNOSIS — Z00.00 ROUTINE GENERAL MEDICAL EXAMINATION AT A HEALTH CARE FACILITY: ICD-10-CM

## 2024-12-24 LAB
ALBUMIN SERPL BCP-MCNC: 4.3 G/DL (ref 3.4–5)
ALP SERPL-CCNC: 79 U/L (ref 33–110)
ALT SERPL W P-5'-P-CCNC: 22 U/L (ref 7–45)
ANION GAP SERPL CALC-SCNC: 14 MMOL/L (ref 10–20)
AST SERPL W P-5'-P-CCNC: 20 U/L (ref 9–39)
BASOPHILS # BLD AUTO: 0.06 X10*3/UL (ref 0–0.1)
BASOPHILS NFR BLD AUTO: 0.5 %
BILIRUB SERPL-MCNC: 0.4 MG/DL (ref 0–1.2)
BUN SERPL-MCNC: 13 MG/DL (ref 6–23)
CALCIUM SERPL-MCNC: 8.9 MG/DL (ref 8.6–10.3)
CHLORIDE SERPL-SCNC: 100 MMOL/L (ref 98–107)
CHOLEST SERPL-MCNC: 186 MG/DL (ref 0–199)
CHOLESTEROL/HDL RATIO: 4.7
CO2 SERPL-SCNC: 25 MMOL/L (ref 21–32)
CREAT SERPL-MCNC: 0.92 MG/DL (ref 0.5–1.05)
EGFRCR SERPLBLD CKD-EPI 2021: 81 ML/MIN/1.73M*2
EOSINOPHIL # BLD AUTO: 0.3 X10*3/UL (ref 0–0.7)
EOSINOPHIL NFR BLD AUTO: 2.7 %
ERYTHROCYTE [DISTWIDTH] IN BLOOD BY AUTOMATED COUNT: 13.9 % (ref 11.5–14.5)
GLUCOSE SERPL-MCNC: 211 MG/DL (ref 74–99)
HCT VFR BLD AUTO: 45.8 % (ref 36–46)
HDLC SERPL-MCNC: 40 MG/DL
HGB BLD-MCNC: 14.3 G/DL (ref 12–16)
IMM GRANULOCYTES # BLD AUTO: 0.03 X10*3/UL (ref 0–0.7)
IMM GRANULOCYTES NFR BLD AUTO: 0.3 % (ref 0–0.9)
LDLC SERPL CALC-MCNC: 110 MG/DL
LYMPHOCYTES # BLD AUTO: 2.92 X10*3/UL (ref 1.2–4.8)
LYMPHOCYTES NFR BLD AUTO: 26.7 %
MCH RBC QN AUTO: 28.4 PG (ref 26–34)
MCHC RBC AUTO-ENTMCNC: 31.2 G/DL (ref 32–36)
MCV RBC AUTO: 91 FL (ref 80–100)
MONOCYTES # BLD AUTO: 0.41 X10*3/UL (ref 0.1–1)
MONOCYTES NFR BLD AUTO: 3.7 %
NEUTROPHILS # BLD AUTO: 7.22 X10*3/UL (ref 1.2–7.7)
NEUTROPHILS NFR BLD AUTO: 66.1 %
NON HDL CHOLESTEROL: 146 MG/DL (ref 0–149)
NRBC BLD-RTO: 0 /100 WBCS (ref 0–0)
PLATELET # BLD AUTO: 376 X10*3/UL (ref 150–450)
POTASSIUM SERPL-SCNC: 4.5 MMOL/L (ref 3.5–5.3)
PROT SERPL-MCNC: 6.8 G/DL (ref 6.4–8.2)
RBC # BLD AUTO: 5.03 X10*6/UL (ref 4–5.2)
SODIUM SERPL-SCNC: 134 MMOL/L (ref 136–145)
TRIGL SERPL-MCNC: 180 MG/DL (ref 0–149)
VLDL: 36 MG/DL (ref 0–40)
WBC # BLD AUTO: 10.9 X10*3/UL (ref 4.4–11.3)

## 2024-12-24 PROCEDURE — 77067 SCR MAMMO BI INCL CAD: CPT | Performed by: RADIOLOGY

## 2024-12-24 PROCEDURE — 83036 HEMOGLOBIN GLYCOSYLATED A1C: CPT

## 2024-12-24 PROCEDURE — 77067 SCR MAMMO BI INCL CAD: CPT

## 2024-12-24 PROCEDURE — 77063 BREAST TOMOSYNTHESIS BI: CPT | Performed by: RADIOLOGY

## 2024-12-26 DIAGNOSIS — R73.01 ELEVATED FASTING GLUCOSE: Primary | ICD-10-CM

## 2024-12-26 PROBLEM — E78.5 DYSLIPIDEMIA: Status: ACTIVE | Noted: 2024-12-26

## 2024-12-26 LAB
EST. AVERAGE GLUCOSE BLD GHB EST-MCNC: 114 MG/DL
HBA1C MFR BLD: 5.6 %

## 2025-01-13 ENCOUNTER — APPOINTMENT (OUTPATIENT)
Dept: PRIMARY CARE | Facility: CLINIC | Age: 40
End: 2025-01-13
Payer: COMMERCIAL

## 2025-01-13 VITALS
WEIGHT: 216.6 LBS | SYSTOLIC BLOOD PRESSURE: 110 MMHG | DIASTOLIC BLOOD PRESSURE: 80 MMHG | HEIGHT: 65 IN | BODY MASS INDEX: 36.09 KG/M2 | HEART RATE: 88 BPM

## 2025-01-13 DIAGNOSIS — F17.210 CIGARETTE NICOTINE DEPENDENCE WITHOUT COMPLICATION: ICD-10-CM

## 2025-01-13 DIAGNOSIS — F41.9 ANXIETY: ICD-10-CM

## 2025-01-13 DIAGNOSIS — I10 PRIMARY HYPERTENSION: Primary | ICD-10-CM

## 2025-01-13 PROCEDURE — 3008F BODY MASS INDEX DOCD: CPT | Performed by: STUDENT IN AN ORGANIZED HEALTH CARE EDUCATION/TRAINING PROGRAM

## 2025-01-13 PROCEDURE — 3074F SYST BP LT 130 MM HG: CPT | Performed by: STUDENT IN AN ORGANIZED HEALTH CARE EDUCATION/TRAINING PROGRAM

## 2025-01-13 PROCEDURE — 99213 OFFICE O/P EST LOW 20 MIN: CPT | Performed by: STUDENT IN AN ORGANIZED HEALTH CARE EDUCATION/TRAINING PROGRAM

## 2025-01-13 PROCEDURE — 3079F DIAST BP 80-89 MM HG: CPT | Performed by: STUDENT IN AN ORGANIZED HEALTH CARE EDUCATION/TRAINING PROGRAM

## 2025-01-13 RX ORDER — ESCITALOPRAM OXALATE 10 MG/1
10 TABLET ORAL DAILY
Qty: 90 TABLET | Refills: 1 | Status: SHIPPED | OUTPATIENT
Start: 2025-01-13 | End: 2025-07-12

## 2025-01-13 NOTE — PROGRESS NOTES
"Subjective   Patient ID: Mile Dumont is a 39 y.o. female who presents for 1 month follow up with labs     HPI  Anxiety - Lexapro and prn Buspar started at last appt, has noticed a huge improvement, feeling much better, she is no longer overthinking, she likes the dose and denies adverse effects, she is taking her Buspar once a day    Cervical Cancer Screening: Women's Care, due  Breast Cancer Screening: due 12/2025  Osteoporosis Screening: plan to start at age 64  Colon Cancer Screening: plan to start at age 44  Tobacco: 1/2ppd since teenager, not yet ready to quit  Alcohol: social  Recreational Drugs: denies  Immunizations: declines influenza and [id number]    Review of Systems   Constitutional:  Negative for chills and fever.   Respiratory:  Negative for cough and shortness of breath.    Cardiovascular:  Negative for chest pain and palpitations.   Skin:  Negative for rash.   Psychiatric/Behavioral:  Negative for dysphoric mood. The patient is not nervous/anxious.      Objective   /80   Pulse 88   Ht 1.651 m (5' 5\")   Wt 98.2 kg (216 lb 9.6 oz)   BMI 36.04 kg/m²     Physical Exam  Constitutional:       Appearance: Normal appearance.   HENT:      Head: Normocephalic.   Eyes:      General: No scleral icterus.     Conjunctiva/sclera: Conjunctivae normal.   Pulmonary:      Effort: Pulmonary effort is normal. No respiratory distress.   Musculoskeletal:         General: Normal range of motion.   Skin:     Findings: No rash.   Neurological:      Mental Status: She is alert.   Psychiatric:         Mood and Affect: Mood normal.         Behavior: Behavior normal.       Assessment/Plan   Problem List Items Addressed This Visit             ICD-10-CM    Primary hypertension - Primary I10     BP at goal. No changes today.         Cigarette nicotine dependence without complication F17.210     Encouraged smoking cessation.         Anxiety F41.9     Much improved with addition of Leapro and Buspar. We reviewed dosing " options and ultimately decided to continue at this time on current doses. Return precautions reviewed.          Relevant Medications    escitalopram (Lexapro) 10 mg tablet    Other Relevant Orders    Follow Up In Primary Care - Established   Follow up in 6mo for recheck, sooner if needed.

## 2025-01-14 ASSESSMENT — ENCOUNTER SYMPTOMS
SHORTNESS OF BREATH: 0
DYSPHORIC MOOD: 0
COUGH: 0
CHILLS: 0
FEVER: 0
PALPITATIONS: 0
NERVOUS/ANXIOUS: 0

## 2025-01-15 NOTE — ASSESSMENT & PLAN NOTE
Much improved with addition of Leapro and Buspar. We reviewed dosing options and ultimately decided to continue at this time on current doses. Return precautions reviewed.

## 2025-01-30 DIAGNOSIS — E66.01 CLASS 2 SEVERE OBESITY DUE TO EXCESS CALORIES WITH SERIOUS COMORBIDITY AND BODY MASS INDEX (BMI) OF 36.0 TO 36.9 IN ADULT: Primary | ICD-10-CM

## 2025-01-30 DIAGNOSIS — E66.812 CLASS 2 SEVERE OBESITY DUE TO EXCESS CALORIES WITH SERIOUS COMORBIDITY AND BODY MASS INDEX (BMI) OF 36.0 TO 36.9 IN ADULT: Primary | ICD-10-CM

## 2025-01-30 RX ORDER — SEMAGLUTIDE 0.25 MG/.5ML
0.25 INJECTION, SOLUTION SUBCUTANEOUS WEEKLY
Qty: 2 ML | Refills: 1 | Status: SHIPPED | OUTPATIENT
Start: 2025-01-30

## 2025-02-27 ENCOUNTER — OFFICE VISIT (OUTPATIENT)
Dept: PRIMARY CARE | Facility: CLINIC | Age: 40
End: 2025-02-27
Payer: COMMERCIAL

## 2025-02-27 VITALS
SYSTOLIC BLOOD PRESSURE: 116 MMHG | WEIGHT: 223 LBS | DIASTOLIC BLOOD PRESSURE: 78 MMHG | BODY MASS INDEX: 37.15 KG/M2 | HEART RATE: 92 BPM | HEIGHT: 65 IN

## 2025-02-27 DIAGNOSIS — E66.01 CLASS 2 SEVERE OBESITY DUE TO EXCESS CALORIES WITH SERIOUS COMORBIDITY AND BODY MASS INDEX (BMI) OF 37.0 TO 37.9 IN ADULT: Primary | ICD-10-CM

## 2025-02-27 DIAGNOSIS — E66.812 CLASS 2 SEVERE OBESITY DUE TO EXCESS CALORIES WITH SERIOUS COMORBIDITY AND BODY MASS INDEX (BMI) OF 37.0 TO 37.9 IN ADULT: Primary | ICD-10-CM

## 2025-02-27 PROCEDURE — 3008F BODY MASS INDEX DOCD: CPT | Performed by: STUDENT IN AN ORGANIZED HEALTH CARE EDUCATION/TRAINING PROGRAM

## 2025-02-27 PROCEDURE — 3078F DIAST BP <80 MM HG: CPT | Performed by: STUDENT IN AN ORGANIZED HEALTH CARE EDUCATION/TRAINING PROGRAM

## 2025-02-27 PROCEDURE — 3074F SYST BP LT 130 MM HG: CPT | Performed by: STUDENT IN AN ORGANIZED HEALTH CARE EDUCATION/TRAINING PROGRAM

## 2025-02-27 PROCEDURE — 99213 OFFICE O/P EST LOW 20 MIN: CPT | Performed by: STUDENT IN AN ORGANIZED HEALTH CARE EDUCATION/TRAINING PROGRAM

## 2025-02-27 ASSESSMENT — ENCOUNTER SYMPTOMS
CHILLS: 0
COUGH: 0
NERVOUS/ANXIOUS: 0
SHORTNESS OF BREATH: 0
PALPITATIONS: 0
DYSPHORIC MOOD: 0
FEVER: 0

## 2025-02-27 NOTE — PROGRESS NOTES
"Subjective   Patient ID: Mile Dumont is a 39 y.o. female who presents for discuss adipex     HPI  BMI - GLP1 unfortunately not affordable, she is interested in trying Adipex again, has tolerated in the past without side effects aside from dry mouth, she drinks quite a bit of water, fills her 40oz bottle 3-4x/d, she tries to follow a well-balanced diet and is mindful of snacking/sweets, she is active at baseline and planning to incorporate exercise, she was evaluated by the bariatric team in the past and was on track for gastric sleeve procedure but ultimately decided against due to permanence of the procedure, she wonders about an intragastric balloon    Review of Systems   Constitutional:  Negative for chills and fever.   Respiratory:  Negative for cough and shortness of breath.    Cardiovascular:  Negative for chest pain and palpitations.   Skin:  Negative for rash.   Psychiatric/Behavioral:  Negative for dysphoric mood. The patient is not nervous/anxious.      Objective   /78   Pulse 92   Ht 1.651 m (5' 5\")   Wt 101 kg (223 lb)   BMI 37.11 kg/m²     Physical Exam  Constitutional:       Appearance: Normal appearance.   HENT:      Head: Normocephalic and atraumatic.   Eyes:      General: No scleral icterus.     Conjunctiva/sclera: Conjunctivae normal.   Cardiovascular:      Rate and Rhythm: Normal rate and regular rhythm.      Heart sounds: No murmur heard.  Pulmonary:      Effort: Pulmonary effort is normal. No respiratory distress.      Breath sounds: Normal breath sounds.   Musculoskeletal:         General: No swelling. Normal range of motion.      Cervical back: Normal range of motion and neck supple.   Skin:     General: Skin is warm and dry.      Findings: No rash.   Neurological:      General: No focal deficit present.      Mental Status: She is alert.   Psychiatric:         Mood and Affect: Mood normal.         Behavior: Behavior normal.       Assessment/Plan   Problem List Items Addressed This " Visit             ICD-10-CM    Class 2 severe obesity due to excess calories with serious comorbidity and body mass index (BMI) of 37.0 to 37.9 in adult - Primary E66.812, E66.01, Z68.37     - Dietary modifications and recommendation for 150 minutes of moderate-intensity exercise per week reviewed.  - We reviewed options for pharmacologic assistance and decided to trial Adipex again as she did well with this in the past  - UDS today, CSA is utd within the year  - Medication dosing and side effects reviewed.  - Will also provide referral for consultation with bariatric team to discuss additional options, possibly to include intragastric balloon    I have personally reviewed the OARRS for this patient. I have considered the risk dependence, addiction, and diversion. There are no concerns at this time. I believe that it is clinically appropriate for this patient to be prescribed this medication based on documented diagnosis.          Relevant Orders    Referral to Bariatric Surgery    Drug Screen, Urine With Reflex to Confirmation    Follow Up In Primary Care - Established   Follow up in 1mo for recheck, sooner if needed.

## 2025-02-27 NOTE — ASSESSMENT & PLAN NOTE
- Dietary modifications and recommendation for 150 minutes of moderate-intensity exercise per week reviewed.  - We reviewed options for pharmacologic assistance and decided to trial Adipex again as she did well with this in the past  - UDS today, CSA is utd within the year  - Medication dosing and side effects reviewed.  - Will also provide referral for consultation with bariatric team to discuss additional options, possibly to include intragastric balloon    I have personally reviewed the OARRS for this patient. I have considered the risk dependence, addiction, and diversion. There are no concerns at this time. I believe that it is clinically appropriate for this patient to be prescribed this medication based on documented diagnosis.

## 2025-02-28 DIAGNOSIS — E66.812 CLASS 2 SEVERE OBESITY DUE TO EXCESS CALORIES WITH SERIOUS COMORBIDITY AND BODY MASS INDEX (BMI) OF 37.0 TO 37.9 IN ADULT: Primary | ICD-10-CM

## 2025-02-28 DIAGNOSIS — E66.01 CLASS 2 SEVERE OBESITY DUE TO EXCESS CALORIES WITH SERIOUS COMORBIDITY AND BODY MASS INDEX (BMI) OF 37.0 TO 37.9 IN ADULT: Primary | ICD-10-CM

## 2025-02-28 LAB
AMPHETAMINES UR QL: NEGATIVE NG/ML
BARBITURATES UR QL: NEGATIVE NG/ML
BENZODIAZ UR QL: NEGATIVE NG/ML
BZE UR QL: NEGATIVE NG/ML
CREAT UR-MCNC: 32.3 MG/DL
METHADONE UR QL: NEGATIVE NG/ML
OPIATES UR QL: NEGATIVE NG/ML
OXIDANTS UR QL: NEGATIVE MCG/ML
OXYCODONE UR QL: NEGATIVE NG/ML
PCP UR QL: NEGATIVE NG/ML
PH UR: 7 [PH] (ref 4.5–9)
QUEST NOTES AND COMMENTS: NORMAL
THC UR QL: NEGATIVE NG/ML

## 2025-02-28 RX ORDER — PHENTERMINE HYDROCHLORIDE 37.5 MG/1
37.5 TABLET ORAL
Qty: 30 TABLET | Refills: 0 | Status: SHIPPED | OUTPATIENT
Start: 2025-02-28

## 2025-03-06 ENCOUNTER — APPOINTMENT (OUTPATIENT)
Dept: PRIMARY CARE | Facility: CLINIC | Age: 40
End: 2025-03-06
Payer: COMMERCIAL

## 2025-04-03 ENCOUNTER — TELEPHONE (OUTPATIENT)
Dept: URGENT CARE | Facility: CLINIC | Age: 40
End: 2025-04-03
Payer: COMMERCIAL

## 2025-04-03 DIAGNOSIS — E66.812 CLASS 2 SEVERE OBESITY DUE TO EXCESS CALORIES WITH SERIOUS COMORBIDITY AND BODY MASS INDEX (BMI) OF 37.0 TO 37.9 IN ADULT: ICD-10-CM

## 2025-04-03 DIAGNOSIS — E66.812 CLASS 2 SEVERE OBESITY DUE TO EXCESS CALORIES WITH SERIOUS COMORBIDITY AND BODY MASS INDEX (BMI) OF 35.0 TO 35.9 IN ADULT: ICD-10-CM

## 2025-04-03 DIAGNOSIS — E66.01 CLASS 2 SEVERE OBESITY DUE TO EXCESS CALORIES WITH SERIOUS COMORBIDITY AND BODY MASS INDEX (BMI) OF 37.0 TO 37.9 IN ADULT: ICD-10-CM

## 2025-04-03 DIAGNOSIS — F17.210 CIGARETTE NICOTINE DEPENDENCE WITHOUT COMPLICATION: ICD-10-CM

## 2025-04-03 DIAGNOSIS — E66.01 CLASS 2 SEVERE OBESITY DUE TO EXCESS CALORIES WITH SERIOUS COMORBIDITY AND BODY MASS INDEX (BMI) OF 35.0 TO 35.9 IN ADULT: ICD-10-CM

## 2025-04-03 DIAGNOSIS — I10 PRIMARY HYPERTENSION: ICD-10-CM

## 2025-04-03 RX ORDER — HYDROCHLOROTHIAZIDE 25 MG/1
25 TABLET ORAL DAILY
Qty: 90 TABLET | Refills: 1 | Status: SHIPPED | OUTPATIENT
Start: 2025-04-03 | End: 2026-04-03

## 2025-04-03 RX ORDER — BUPROPION HYDROCHLORIDE 300 MG/1
300 TABLET ORAL EVERY MORNING
Qty: 90 TABLET | Refills: 1 | Status: SHIPPED | OUTPATIENT
Start: 2025-04-03 | End: 2026-04-03

## 2025-04-03 RX ORDER — LISINOPRIL 10 MG/1
10 TABLET ORAL DAILY
Qty: 90 TABLET | Refills: 1 | Status: SHIPPED | OUTPATIENT
Start: 2025-04-03 | End: 2026-04-03

## 2025-04-03 RX ORDER — PHENTERMINE HYDROCHLORIDE 37.5 MG/1
37.5 TABLET ORAL
Qty: 30 TABLET | Refills: 0 | Status: SHIPPED | OUTPATIENT
Start: 2025-04-03

## 2025-04-03 NOTE — TELEPHONE ENCOUNTER
Incoming call from pt regarding refills from PCP. I informed her I was able to see refill request and looked like the medications she needed were sent into [facility] today.

## 2025-04-09 ENCOUNTER — APPOINTMENT (OUTPATIENT)
Dept: PRIMARY CARE | Facility: CLINIC | Age: 40
End: 2025-04-09
Payer: COMMERCIAL

## 2025-04-09 VITALS
HEART RATE: 92 BPM | DIASTOLIC BLOOD PRESSURE: 84 MMHG | WEIGHT: 220 LBS | SYSTOLIC BLOOD PRESSURE: 124 MMHG | BODY MASS INDEX: 36.65 KG/M2 | HEIGHT: 65 IN

## 2025-04-09 DIAGNOSIS — E66.01 CLASS 2 SEVERE OBESITY DUE TO EXCESS CALORIES WITH SERIOUS COMORBIDITY AND BODY MASS INDEX (BMI) OF 36.0 TO 36.9 IN ADULT: ICD-10-CM

## 2025-04-09 DIAGNOSIS — E66.812 CLASS 2 SEVERE OBESITY DUE TO EXCESS CALORIES WITH SERIOUS COMORBIDITY AND BODY MASS INDEX (BMI) OF 36.0 TO 36.9 IN ADULT: ICD-10-CM

## 2025-04-09 DIAGNOSIS — R73.01 ELEVATED FASTING GLUCOSE: ICD-10-CM

## 2025-04-09 DIAGNOSIS — I10 PRIMARY HYPERTENSION: Primary | ICD-10-CM

## 2025-04-09 DIAGNOSIS — F17.210 CIGARETTE NICOTINE DEPENDENCE WITHOUT COMPLICATION: ICD-10-CM

## 2025-04-09 DIAGNOSIS — E78.5 DYSLIPIDEMIA: ICD-10-CM

## 2025-04-09 PROCEDURE — 3008F BODY MASS INDEX DOCD: CPT | Performed by: STUDENT IN AN ORGANIZED HEALTH CARE EDUCATION/TRAINING PROGRAM

## 2025-04-09 PROCEDURE — 3079F DIAST BP 80-89 MM HG: CPT | Performed by: STUDENT IN AN ORGANIZED HEALTH CARE EDUCATION/TRAINING PROGRAM

## 2025-04-09 PROCEDURE — 3074F SYST BP LT 130 MM HG: CPT | Performed by: STUDENT IN AN ORGANIZED HEALTH CARE EDUCATION/TRAINING PROGRAM

## 2025-04-09 PROCEDURE — 99213 OFFICE O/P EST LOW 20 MIN: CPT | Performed by: STUDENT IN AN ORGANIZED HEALTH CARE EDUCATION/TRAINING PROGRAM

## 2025-04-09 RX ORDER — NICOTINE 7MG/24HR
1 PATCH, TRANSDERMAL 24 HOURS TRANSDERMAL EVERY 24 HOURS
Qty: 30 PATCH | Refills: 1 | Status: SHIPPED | OUTPATIENT
Start: 2025-04-09

## 2025-04-09 RX ORDER — MICONAZOLE NITRATE 2 %
2 CREAM (GRAM) TOPICAL AS NEEDED
Qty: 100 EACH | Refills: 0 | Status: SHIPPED | OUTPATIENT
Start: 2025-04-09 | End: 2025-05-09

## 2025-04-09 NOTE — PROGRESS NOTES
"Subjective   Patient ID: Mile Dumont is a 39 y.o. female who presents for 1 month follow up    HPI  BMI - managed on Adipex, she is tolerating without adverse effects, down 3lb, she feels that the medication is helping more this time as compared to last time, she was referred to surgical team at last appt but ultimately decided to wait until after the summer, she continues to be very mindful of her diet and remains active    Smoking cessation - she is ready to quit, is currently smoking 1/2ppd which she has done since she was a teenager    Review of Systems   Constitutional:  Negative for chills and fever.   Respiratory:  Negative for cough and shortness of breath.    Cardiovascular:  Negative for chest pain and palpitations.   Skin:  Negative for rash.   Psychiatric/Behavioral:  Negative for dysphoric mood. The patient is not nervous/anxious.      Objective   /84   Pulse 92   Ht 1.651 m (5' 5\")   Wt 99.8 kg (220 lb)   BMI 36.61 kg/m²     Physical Exam  Constitutional:       Appearance: Normal appearance.   HENT:      Head: Normocephalic and atraumatic.   Eyes:      General: No scleral icterus.     Conjunctiva/sclera: Conjunctivae normal.   Cardiovascular:      Rate and Rhythm: Normal rate and regular rhythm.      Heart sounds: No murmur heard.  Pulmonary:      Effort: Pulmonary effort is normal. No respiratory distress.      Breath sounds: Normal breath sounds.   Musculoskeletal:         General: No swelling. Normal range of motion.      Cervical back: Normal range of motion and neck supple.   Skin:     General: Skin is warm and dry.      Findings: No rash.   Neurological:      General: No focal deficit present.      Mental Status: She is alert.   Psychiatric:         Mood and Affect: Mood normal.         Behavior: Behavior normal.       Assessment/Plan   Problem List Items Addressed This Visit             ICD-10-CM    Primary hypertension - Primary I10    Relevant Medications    tirzepatide, weight " loss, (Zepbound) 2.5 mg/0.5 mL injection    Other Relevant Orders    Follow Up In Primary Care - Established    Elevated fasting glucose R73.01    Relevant Medications    tirzepatide, weight loss, (Zepbound) 2.5 mg/0.5 mL injection    Other Relevant Orders    Follow Up In Primary Care - Established    Dyslipidemia E78.5    Relevant Medications    tirzepatide, weight loss, (Zepbound) 2.5 mg/0.5 mL injection    Other Relevant Orders    Follow Up In Primary Care - Established    Class 2 severe obesity due to excess calories with serious comorbidity and body mass index (BMI) of 36.0 to 36.9 in adult E66.812, E66.01, Z68.36     - Dietary modifications and recommendation for 150 minutes of moderate-intensity exercise per week reviewed.  - Managed on Adipex  - Down 3lb  - We will continue  - This is month #2  - I continue to think she is a great candidate for glp1 therapy due to concomitant HTN, HLD, elevated glucose    I have personally reviewed the OARRS for this patient. I have considered the risk dependence, addiction, and diversion. There are no concerns at this time. I believe that it is clinically appropriate for this patient to be prescribed this medication based on documented diagnosis.          Relevant Medications    tirzepatide, weight loss, (Zepbound) 2.5 mg/0.5 mL injection    Other Relevant Orders    Follow Up In Primary Care - Established    Cigarette nicotine dependence without complication F17.210     - She is ready to quit  - We reviewed options for pharmacologic assistance  - Using shared decision making, we decided to trial NRT (patch + gum)  - Medication dosing and side effects reviewed.          Relevant Medications    nicotine (Nicoderm CQ) 7 mg/24 hr patch    nicotine polacrilex (Nicorette) 2 mg gum    Other Relevant Orders    Follow Up In Primary Care - Established   Follow up in 1mo for recheck, sooner if needed.

## 2025-04-10 ASSESSMENT — ENCOUNTER SYMPTOMS
COUGH: 0
SHORTNESS OF BREATH: 0
CHILLS: 0
FEVER: 0
DYSPHORIC MOOD: 0
PALPITATIONS: 0
NERVOUS/ANXIOUS: 0

## 2025-04-11 NOTE — ASSESSMENT & PLAN NOTE
- Dietary modifications and recommendation for 150 minutes of moderate-intensity exercise per week reviewed.  - Managed on Adipex  - Down 3lb  - We will continue  - This is month #2  - I continue to think she is a great candidate for glp1 therapy due to concomitant HTN, HLD, elevated glucose    I have personally reviewed the OARRS for this patient. I have considered the risk dependence, addiction, and diversion. There are no concerns at this time. I believe that it is clinically appropriate for this patient to be prescribed this medication based on documented diagnosis.

## 2025-04-11 NOTE — ASSESSMENT & PLAN NOTE
- She is ready to quit  - We reviewed options for pharmacologic assistance  - Using shared decision making, we decided to trial NRT (patch + gum)  - Medication dosing and side effects reviewed.

## 2025-04-18 ENCOUNTER — TELEPHONE (OUTPATIENT)
Dept: SURGERY | Facility: CLINIC | Age: 40
End: 2025-04-18
Payer: COMMERCIAL

## 2025-05-02 ENCOUNTER — APPOINTMENT (OUTPATIENT)
Dept: PRIMARY CARE | Facility: CLINIC | Age: 40
End: 2025-05-02
Payer: COMMERCIAL

## 2025-05-09 DIAGNOSIS — F17.210 CIGARETTE NICOTINE DEPENDENCE WITHOUT COMPLICATION: Primary | ICD-10-CM

## 2025-05-09 RX ORDER — IBUPROFEN 200 MG
1 TABLET ORAL EVERY 24 HOURS
Qty: 30 PATCH | Refills: 1 | Status: SHIPPED | OUTPATIENT
Start: 2025-05-09

## 2025-06-12 ENCOUNTER — APPOINTMENT (OUTPATIENT)
Dept: PRIMARY CARE | Facility: CLINIC | Age: 40
End: 2025-06-12
Payer: COMMERCIAL

## 2025-06-12 VITALS
HEART RATE: 84 BPM | HEIGHT: 65 IN | SYSTOLIC BLOOD PRESSURE: 110 MMHG | WEIGHT: 213 LBS | DIASTOLIC BLOOD PRESSURE: 80 MMHG | BODY MASS INDEX: 35.49 KG/M2

## 2025-06-12 DIAGNOSIS — E66.812 CLASS 2 SEVERE OBESITY DUE TO EXCESS CALORIES WITH SERIOUS COMORBIDITY AND BODY MASS INDEX (BMI) OF 35.0 TO 35.9 IN ADULT: ICD-10-CM

## 2025-06-12 DIAGNOSIS — F41.9 ANXIETY: ICD-10-CM

## 2025-06-12 DIAGNOSIS — E66.01 CLASS 2 SEVERE OBESITY DUE TO EXCESS CALORIES WITH SERIOUS COMORBIDITY AND BODY MASS INDEX (BMI) OF 35.0 TO 35.9 IN ADULT: ICD-10-CM

## 2025-06-12 DIAGNOSIS — I10 PRIMARY HYPERTENSION: Primary | ICD-10-CM

## 2025-06-12 DIAGNOSIS — F17.210 CIGARETTE NICOTINE DEPENDENCE WITHOUT COMPLICATION: ICD-10-CM

## 2025-06-12 DIAGNOSIS — E78.5 DYSLIPIDEMIA: ICD-10-CM

## 2025-06-12 PROCEDURE — 3074F SYST BP LT 130 MM HG: CPT | Performed by: STUDENT IN AN ORGANIZED HEALTH CARE EDUCATION/TRAINING PROGRAM

## 2025-06-12 PROCEDURE — 3079F DIAST BP 80-89 MM HG: CPT | Performed by: STUDENT IN AN ORGANIZED HEALTH CARE EDUCATION/TRAINING PROGRAM

## 2025-06-12 PROCEDURE — 3008F BODY MASS INDEX DOCD: CPT | Performed by: STUDENT IN AN ORGANIZED HEALTH CARE EDUCATION/TRAINING PROGRAM

## 2025-06-12 PROCEDURE — 99214 OFFICE O/P EST MOD 30 MIN: CPT | Performed by: STUDENT IN AN ORGANIZED HEALTH CARE EDUCATION/TRAINING PROGRAM

## 2025-06-12 NOTE — PROGRESS NOTES
"Subjective   Patient ID: Mile Dumont is a 39 y.o. female who presents for 1 month follow up    HPI  BMI - managed on GLP1 from [employer], started 5wks ago and has increased to second dose, she is tolerating without adverse effects aside from mild nausea which she states is manageable, she is down 10#    Smoking cessation - working on quitting with nicotine patches, she has had a little difficulty getting them to stick but plans to keep trying    HTN - managed on lisinopril 10mg and hydrochlorothiazide 25mg every day, BP is at goal, she occasionally feels LH with positional changes but otherwise feels great    Anxiety - managed on Lexapro 10mg every day, feels well from a mental health standpoint    Cervical Cancer Screening: due  Breast Cancer Screening: due 12/2025  Osteoporosis Screening: plan to start at age 64  Colon Cancer Screening: plan to start at age 44  Tobacco: 1/2ppd since teenager, ready to quit  Alcohol: social  Recreational Drugs: denies  Immunizations: declines influenza and [id number]    Review of Systems   Constitutional:  Negative for chills and fever.   Respiratory:  Negative for cough and shortness of breath.    Cardiovascular:  Negative for chest pain and palpitations.   Gastrointestinal:  Negative for abdominal pain, constipation, diarrhea and vomiting.   Skin:  Negative for rash.   Psychiatric/Behavioral:  Negative for dysphoric mood. The patient is not nervous/anxious.      Objective   /80   Pulse 84   Ht 1.651 m (5' 5\")   Wt 96.6 kg (213 lb)   BMI 35.45 kg/m²     Physical Exam  Constitutional:       Appearance: Normal appearance.   HENT:      Head: Normocephalic and atraumatic.   Eyes:      General: No scleral icterus.     Conjunctiva/sclera: Conjunctivae normal.   Cardiovascular:      Rate and Rhythm: Normal rate and regular rhythm.      Heart sounds: No murmur heard.  Pulmonary:      Effort: Pulmonary effort is normal. No respiratory distress.      Breath sounds: " Normal breath sounds.   Musculoskeletal:         General: No swelling. Normal range of motion.      Cervical back: Normal range of motion and neck supple.   Skin:     General: Skin is warm and dry.      Findings: No rash.   Neurological:      General: No focal deficit present.      Mental Status: She is alert.   Psychiatric:         Mood and Affect: Mood normal.         Behavior: Behavior normal.     Assessment/Plan   Problem List Items Addressed This Visit           ICD-10-CM    Primary hypertension - Primary I10    - Managed on lisinopril 10mg and hydrochlorothiazide 25mg every day  - BP at goal  - We reviewed that we may need to down titrate her regimen as she continues on her weight loss journey  - Return precautions reviewed.          Relevant Orders    CBC and Auto Differential    Comprehensive Metabolic Panel    Follow Up In Primary Care - Health Maintenance    Dyslipidemia E78.5    - Will update labs prior to next appt         Relevant Orders    CBC and Auto Differential    Comprehensive Metabolic Panel    Lipid Panel    Follow Up In Primary Care - Health Maintenance    Class 2 severe obesity due to excess calories with serious comorbidity and body mass index (BMI) of 35.0 to 35.9 in adult E66.812, E66.01, Z68.35    - Managed on GLP1 via [employer]  - Down 10#         Cigarette nicotine dependence without complication F17.210    - Working on weaning  - Will continue nicotine patches         Relevant Orders    Follow Up In Primary Care - Health Maintenance    Anxiety F41.9    - Managed on Lexapro 10mg every day  - Well-controlled        Follow up in 6mo for physical/pap, sooner if needed. Labs prior to appt.

## 2025-06-15 PROBLEM — R73.01 ELEVATED FASTING GLUCOSE: Status: RESOLVED | Noted: 2024-12-26 | Resolved: 2025-06-15

## 2025-06-15 ASSESSMENT — ENCOUNTER SYMPTOMS
SHORTNESS OF BREATH: 0
ABDOMINAL PAIN: 0
DYSPHORIC MOOD: 0
PALPITATIONS: 0
DIARRHEA: 0
CHILLS: 0
NERVOUS/ANXIOUS: 0
COUGH: 0
CONSTIPATION: 0
FEVER: 0
VOMITING: 0

## 2025-06-15 NOTE — ASSESSMENT & PLAN NOTE
- Managed on lisinopril 10mg and hydrochlorothiazide 25mg every day  - BP at goal  - We reviewed that we may need to down titrate her regimen as she continues on her weight loss journey  - Return precautions reviewed.

## 2025-07-17 ENCOUNTER — APPOINTMENT (OUTPATIENT)
Dept: PRIMARY CARE | Facility: CLINIC | Age: 40
End: 2025-07-17
Payer: COMMERCIAL

## 2025-07-22 ENCOUNTER — APPOINTMENT (OUTPATIENT)
Dept: PRIMARY CARE | Facility: CLINIC | Age: 40
End: 2025-07-22
Payer: COMMERCIAL

## 2025-08-14 DIAGNOSIS — F41.9 ANXIETY: ICD-10-CM

## 2025-08-18 RX ORDER — ESCITALOPRAM OXALATE 10 MG/1
10 TABLET ORAL DAILY
Qty: 90 TABLET | Refills: 3 | Status: SHIPPED | OUTPATIENT
Start: 2025-08-18

## 2025-12-18 ENCOUNTER — APPOINTMENT (OUTPATIENT)
Dept: PRIMARY CARE | Facility: CLINIC | Age: 40
End: 2025-12-18
Payer: COMMERCIAL